# Patient Record
Sex: MALE | Race: OTHER | HISPANIC OR LATINO | URBAN - METROPOLITAN AREA
[De-identification: names, ages, dates, MRNs, and addresses within clinical notes are randomized per-mention and may not be internally consistent; named-entity substitution may affect disease eponyms.]

---

## 2022-05-26 ENCOUNTER — EMERGENCY (EMERGENCY)
Facility: HOSPITAL | Age: 37
LOS: 1 days | Discharge: HOME | End: 2022-05-26
Attending: EMERGENCY MEDICINE | Admitting: EMERGENCY MEDICINE
Payer: COMMERCIAL

## 2022-05-26 VITALS
RESPIRATION RATE: 16 BRPM | WEIGHT: 185.19 LBS | DIASTOLIC BLOOD PRESSURE: 81 MMHG | HEART RATE: 87 BPM | OXYGEN SATURATION: 97 % | TEMPERATURE: 98 F | SYSTOLIC BLOOD PRESSURE: 127 MMHG

## 2022-05-26 PROCEDURE — 73502 X-RAY EXAM HIP UNI 2-3 VIEWS: CPT

## 2022-05-26 PROCEDURE — 99284 EMERGENCY DEPT VISIT MOD MDM: CPT | Mod: 25

## 2022-05-26 PROCEDURE — 73502 X-RAY EXAM HIP UNI 2-3 VIEWS: CPT | Mod: 26,RT

## 2022-05-26 PROCEDURE — 96372 THER/PROPH/DIAG INJ SC/IM: CPT

## 2022-05-26 PROCEDURE — 99283 EMERGENCY DEPT VISIT LOW MDM: CPT | Mod: 25

## 2022-05-26 RX ORDER — OXYCODONE HYDROCHLORIDE 5 MG/1
1 TABLET ORAL
Qty: 12 | Refills: 0
Start: 2022-05-26 | End: 2022-05-28

## 2022-05-26 RX ORDER — IBUPROFEN 200 MG
1 TABLET ORAL
Qty: 21 | Refills: 0
Start: 2022-05-26 | End: 2022-06-01

## 2022-05-26 RX ORDER — OXYCODONE AND ACETAMINOPHEN 5; 325 MG/1; MG/1
1 TABLET ORAL ONCE
Refills: 0 | Status: DISCONTINUED | OUTPATIENT
Start: 2022-05-26 | End: 2022-05-26

## 2022-05-26 RX ORDER — METHOCARBAMOL 500 MG/1
1 TABLET, FILM COATED ORAL
Qty: 15 | Refills: 0
Start: 2022-05-26 | End: 2022-05-30

## 2022-05-26 RX ORDER — KETOROLAC TROMETHAMINE 30 MG/ML
15 SYRINGE (ML) INJECTION ONCE
Refills: 0 | Status: DISCONTINUED | OUTPATIENT
Start: 2022-05-26 | End: 2022-05-26

## 2022-05-26 RX ADMIN — OXYCODONE AND ACETAMINOPHEN 1 TABLET(S): 5; 325 TABLET ORAL at 13:24

## 2022-05-26 RX ADMIN — Medication 15 MILLIGRAM(S): at 13:24

## 2022-05-26 NOTE — ED ADULT TRIAGE NOTE - CHIEF COMPLAINT QUOTE
pt complaining of right hip pain radiating to the knee since yesterday reports he almost fell down the steps at the [facility] but caught himself no head trauma LOC or blunt injury to left leg just twisted it in a way that started the pain. Pt bend knee easily no obvious deformity

## 2022-05-26 NOTE — ED PROVIDER NOTE - PATIENT PORTAL LINK FT
You can access the FollowMyHealth Patient Portal offered by [facility] by registering at the following website: [email or web address]. By joining [facility]’s FollowMyHealth portal, you will also be able to view your health information using other applications (apps) compatible with our system.

## 2022-05-26 NOTE — ED PROVIDER NOTE - OBJECTIVE STATEMENT
yesterday was at [employer] and had a near fall while going downstairs, was able to catch himself but pulled on right groin area.  intially min pain but then about 30 min later pain with walking especially, pivoting/turning leg  able to weight bear  no weakness or numbness  no difficulty urinating  tried tylenol, ibuprofen with min relief  offered  services, feels that he understands and can explain everything, declined.  aware that they are available if he wants

## 2022-05-26 NOTE — ED PROVIDER NOTE - PHYSICAL EXAMINATION
tender right medial thigh/groin only with rom abduction/adduction  nt over hip and thigh  no swelling or deformity

## 2022-05-26 NOTE — ED PROVIDER NOTE - CLINICAL SUMMARY MEDICAL DECISION MAKING FREE TEXT BOX
healthy male with pain in right thigh/groin area after near fall yesterday  probable muscle strain.  will get xray hip/pelvis  give pain medication  will try crutches to help with weight bearing pain healthy male with pain in right thigh/groin area after near fall yesterday  probable muscle strain.  will get xray hip/pelvis  give pain medication  will try crutches to help with weight bearing pain  xray shows no fracture  pt will fu back in [place], he will be back home within a week

## 2022-05-26 NOTE — ED ADULT NURSE NOTE - NSIMPLEMENTINTERV_GEN_ALL_ED
Implemented All Fall Risk Interventions:  [place] to call system. Call bell, personal items and telephone within reach. Instruct patient to call for assistance. Room bathroom lighting operational. Non-slip footwear when patient is off stretcher. Physically safe environment: no spills, clutter or unnecessary equipment. Stretcher in lowest position, wheels locked, appropriate side rails in place. Provide visual cue, wrist band, yellow gown, etc. Monitor gait and stability. Monitor for mental status changes and reorient to person, place, and time. Review medications for side effects contributing to fall risk. Reinforce activity limits and safety measures with patient and family.

## 2022-05-26 NOTE — ED ADULT NURSE NOTE - OBJECTIVE STATEMENT
Pt presents to ED C/O R upper leg/ groin pain radiating to R knee S/[facility]. Pt denies fall, denies other associated pain, urinating normally, denies testicular pain. Pt C/O difficulty ambulating due to pain. Awaiting imaging.

## 2022-05-26 NOTE — ED PROVIDER NOTE - NSFOLLOWUPINSTRUCTIONS_ED_ALL_ED_FT
Distensión inguinal    Adductor Muscle Strain       Uriel distensión inguinal, también llamada distensión o tirón en la angélica, es uriel lesión en los músculos o los tendones de la parte superior interna del muslo. Estos músculos se llaman aductores o músculos de la angélica. Son los que permiten  las piernas cruzando el cuerpo o juntar las piernas.    Uriel distensión muscular se produce cuando un músculo se estira demasiado y algunas fibras musculares se rompen. Uriel distensión inguinal puede variar de leve a grave, según la cantidad de fibras musculares afectadas y si estas se ruiz desgarrado parcial o totalmente.      ¿Cuáles son las causas?    Las distensiones inguinales generalmente ocurren al hacer ejercicio o al practicar deportes. La lesión ocurre cuando se ejerce uriel fuerza violenta y súbita en un músculo, y el músculo se extiende demasiado. También hay más probabilidades de que suceda cuando no se hace precalentamiento de los músculos o si usted no está en condiciones adecuadas.    Esta lesión puede deberse a lo siguiente:  •Estirar demasiado o muy rápido los músculos aductores, generalmente catarina un movimiento de lado a lado, con un cambio repentino de dirección.      •Sobrecargar repetidamente los músculos aductores catarina un tiempo prolongado.      •Realizar actividades vigorosas sin antes elongar correctamente los músculos aductores.        ¿Cuáles son los signos o los síntomas?    Los síntomas de esta afección incluyen los siguientes:  •Dolor y sensibilidad en el área inguinal. Comienza jose elias un dolor sree y persiste jose elias un dolor sordo.      •Sensación de crujido o chasquido cuando se produce la lesión (en jatinder de las distensiones graves).      •Hinchazón o moretones.      •Espasmos musculares.      •Debilidad en las piernas.      •Rigidez en la caty de la angélica, con disminución de la capacidad de  los músculos afectados.        ¿Cómo se diagnostica?    Esta afección se puede diagnosticar en función de lo siguiente:  •Claribel síntomas y la descripción de cómo se produjo la lesión.      •Un examen físico.    •Pruebas de diagnóstico por imágenes, por ejemplo:  •Radiografías. En algunos casos son necesarias para descartar uriel fractura ósea o problemas en el cartílago.      •Ecografía, exploración por tomografía computarizada (TC) o resonancia magnética (RM). Estos estudios pueden realizarse si el médico sospecha que el músculo está desgarrado totalmente o si necesita determinar la presencia de otras lesiones.          ¿Cómo se trata?    Uriel distensión inguinal se curará por sí cosmo. Si es necesario, el tratamiento para esta afección puede ser:  •Tratamiento de PRHCE. La sigla PRHCE corresponde a protección de la caty lesionada, reposo, hielo, presión (compresión) y elevación.      •Medicamentos para calmar el dolor y la hinchazón (antiinflamatorios).      •Muletas. Es posible que le indiquen que los utilice catarina los primeros días para minimizar el dolor.      Según la gravedad de la distensión inguinal, el tiempo de recuperación puede variar de unas pocas semanas a varios meses. Las lesiones graves requieren 4 a 6 semanas para jamison recuperación. En estos casos, la curación completa puede demorar de 4 a 5 meses.      Siga estas indicaciones en jamison casa:      Tratamiento de PRHCE      •Proteja al músculo de nuevas lesiones.      •Reposo. No use el músculo lesionado si siente dolor.    •Si se lo indican, aplique hielo sobre la caty de la lesión:  •Ponga el hielo en uriel bolsa plástica.      •Coloque uriel toalla entre la piel y la bolsa.      •Coloque el hielo catarina 20 minutos, 2 a 3 veces por día. Hágalo catarina los dos primeros días después de la lesión.        •Comprima la caty lesionada con uriel venda elástica jose elias se lo haya indicado el médico.      •Cuando esté sentado o acostado, levante (eleve) la caty lesionada por encima del nivel del corazón.      Indicaciones generales     •[place] los medicamentos de venta kaitlin y los recetados solamente jose elias se lo haya indicado el médico.      •Camine, elongue y rebecca ejercicio jose elias se lo haya indicado el médico. Realice estas actividades solo si puede hacerlas sin sentir dolor.    •Siga el plan de tratamiento jose elias se lo haya indicado el médico. [place] puede incluir:  •Fisioterapia.      •Masajes.      •Estimulación eléctrica local (estimulación nerviosa eléctrica transcutánea, NET).          ¿Cómo se mac?    •Precaliente y elongue adecuadamente antes de hacer actividad física.      •Relájese y elongue después de hacer actividad física.      •Jose al cuerpo tiempo para descansar entre los períodos de actividad física.      •Asegúrese de usar el equipo que sea apto para usted.      •[place] medidas de seguridad y sea responsable al hacer uriel actividad, para evitar los resbalones y las caídas.    •Mantenga un buen estado físico, que incluye lo siguiente:  •Acondicionamiento adecuado de los músculos aductores.      •Fuerza, flexibilidad y resistencia general.          Comuníquese con un médico si:    •Siente un dolor cada vez más intenso o presenta hinchazón en la caty afectada.      •Los síntomas no mejoran o empeoran.        Resumen    •Uriel distensión inguinal, también llamada distensión o tirón en la angélica, es uriel lesión en los músculos o los tendones de la parte superior interna del muslo.      •Uriel distensión muscular se produce cuando un músculo se estira demasiado y algunas fibras musculares se rompen.      •Según la gravedad de la distensión inguinal, el tiempo de recuperación puede variar de unas pocas semanas a varios meses.      Esta información no tiene jose elias fin reemplazar el consejo del médico. Asegúrese de hacerle al médico cualquier pregunta que tenga.      Document Revised: 06/21/2019 Document Reviewed: 06/21/2019    Elsevier Patient Education © 2022 [employer]
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
## 2022-05-30 DIAGNOSIS — S39.013A STRAIN OF MUSCLE, FASCIA AND TENDON OF PELVIS, INITIAL ENCOUNTER: ICD-10-CM

## 2022-05-30 DIAGNOSIS — M25.551 PAIN IN RIGHT HIP: ICD-10-CM
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
## 2022-05-30 DIAGNOSIS — M79.651 PAIN IN RIGHT THIGH: ICD-10-CM
